# Patient Record
Sex: FEMALE | Race: OTHER | HISPANIC OR LATINO | URBAN - METROPOLITAN AREA
[De-identification: names, ages, dates, MRNs, and addresses within clinical notes are randomized per-mention and may not be internally consistent; named-entity substitution may affect disease eponyms.]

---

## 2022-05-12 ENCOUNTER — EMERGENCY (EMERGENCY)
Facility: HOSPITAL | Age: 42
LOS: 1 days | Discharge: HOME | End: 2022-05-12
Attending: EMERGENCY MEDICINE | Admitting: EMERGENCY MEDICINE
Payer: COMMERCIAL

## 2022-05-12 VITALS
TEMPERATURE: 98 F | HEART RATE: 119 BPM | DIASTOLIC BLOOD PRESSURE: 64 MMHG | WEIGHT: 169.09 LBS | HEIGHT: 69 IN | SYSTOLIC BLOOD PRESSURE: 98 MMHG | OXYGEN SATURATION: 91 % | RESPIRATION RATE: 16 BRPM

## 2022-05-12 VITALS
TEMPERATURE: 98 F | SYSTOLIC BLOOD PRESSURE: 107 MMHG | DIASTOLIC BLOOD PRESSURE: 67 MMHG | HEART RATE: 88 BPM | OXYGEN SATURATION: 100 % | RESPIRATION RATE: 16 BRPM
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
## 2022-05-12 DIAGNOSIS — J45.909 UNSPECIFIED ASTHMA, UNCOMPLICATED: ICD-10-CM

## 2022-05-12 DIAGNOSIS — T78.05XA ANAPHYLACTIC REACTION DUE TO TREE NUTS AND SEEDS, INITIAL ENCOUNTER: ICD-10-CM

## 2022-05-12 DIAGNOSIS — R21 RASH AND OTHER NONSPECIFIC SKIN ERUPTION: ICD-10-CM

## 2022-05-12 PROCEDURE — 99284 EMERGENCY DEPT VISIT MOD MDM: CPT

## 2022-05-12 RX ORDER — DIPHENHYDRAMINE HCL 50 MG
1 CAPSULE ORAL
Qty: 15 | Refills: 0
Start: 2022-05-12 | End: 2022-05-16

## 2022-05-12 RX ORDER — EPINEPHRINE 0.3 MG/.3ML
0.3 INJECTION INTRAMUSCULAR; SUBCUTANEOUS
Qty: 1 | Refills: 0
Start: 2022-05-12

## 2022-05-12 NOTE — ED PROVIDER NOTE - PATIENT PORTAL LINK FT
You can access the FollowMyHealth Patient Portal offered by [facility] by registering at the following website: [email or web address]. By joining [employer]’s FollowMyHealth portal, you will also be able to view your health information using other applications (apps) compatible with our system.

## 2022-05-12 NOTE — ED PROVIDER NOTE - CLINICAL SUMMARY MEDICAL DECISION MAKING FREE TEXT BOX
Received appropriate treatment for anaphylaxis prior to arrival with good effect. No rebound sx during observation. d/c home with supportive care measures/continued steroids and epi pen. given return precautions.

## 2022-05-12 NOTE — ED PROVIDER NOTE - NSFOLLOWUPINSTRUCTIONS_ED_ALL_ED_FT
Anaphylaxis    WHAT YOU NEED TO KNOW:    Anaphylaxis is a life-threatening allergic reaction that must be treated immediately. Your risk for anaphylaxis increases if you have asthma that is severe or not controlled. Medical conditions such as heart disease can also increase your risk. It is important to be prepared if you are at risk for anaphylaxis. Your symptoms can be worse each time you are exposed to the trigger.     DISCHARGE INSTRUCTIONS:    Steps to take for signs or symptoms of anaphylaxis:   •Immediately give 1 shot of epinephrine only into the outer thigh muscle. Even if your allergic reaction seems mild, it can quickly become anaphylaxis. This may happen even if you had a mild reaction to the allergen in the past. Each exposure can cause a different reaction. Watch for signs and symptoms of anaphylaxis every time you are exposed to a trigger. Be ready to give a shot of epinephrine. It is okay to inject epinephrine through clothing. Just be careful to avoid seams, zippers, or other parts that can prevent the needle from entering your skin.  How to Give An Epinephrine Shot Adult           •Leave the shot in place as directed. Your healthcare provider may recommend you leave it in place for up to 10 seconds before you remove it. This helps make sure all of the epinephrine is delivered.       •Call 911 and go to the emergency department, even if the shot improved symptoms. Do not drive yourself. Bring the used epinephrine shot with you.       Call 911 for any of the following:   •You have a skin rash, hives, swelling, or itching.       •You have trouble breathing, shortness of breath, wheezing, or coughing.      •Your throat tightens or your lips or tongue swell.      •You have difficulty swallowing or speaking.      •You are dizzy, lightheaded, confused, or feel like you are going to faint.      •You have nausea, diarrhea, or abdominal cramps, or you are vomiting.      Return to the emergency department if:   •Signs or symptoms of anaphylaxis return.           Contact your healthcare provider if:   •You have questions or concerns about your condition or care.          Medicines:   •Epinephrine is medicine used to treat severe allergic reactions such as anaphylaxis. It is given as a shot into the outer thigh muscle.      •Medicines such as antihistamines, steroids, and bronchodilators decrease inflammation, open airways, and make breathing easier.      •Take your medicine as directed. Contact your healthcare provider if you think your medicine is not helping or if you have side effects. Tell him or her if you are allergic to any medicine. Keep a list of the medicines, vitamins, and herbs you take. Include the amounts, and when and why you take them. Bring the list or the pill bottles to follow-up visits. Carry your medicine list with you in case of an emergency.      Follow up with your healthcare provider as directed: Allergy testing may reveal allergies that can trigger anaphylaxis. Write down your questions so you remember to ask them during your visits.     Safety precautions:   •Keep 2 shots of epinephrine with you at all times. You may need a second shot, because epinephrine only works for about 20 minutes and symptoms may return. Your healthcare provider can show you and family members how to give the shot. Check the expiration date every month and replace it before it expires.      •Create an action plan. Your healthcare provider can help you create a written plan that explains the allergy and an emergency plan to treat a reaction. The plan explains when to give a second epinephrine shot if symptoms return or do not improve after the first. Give copies of the action plan and emergency instructions to family members, and work or school staff. Show them how to give a shot of epinephrine in case you are not able to give it to yourself.      •Be careful when you exercise. If you have had exercise-induced anaphylaxis, do not exercise right after you eat. Stop exercising right away if you start to develop any signs or symptoms of anaphylaxis. You may first feel tired, warm, or have itchy skin. Hives, swelling, and severe breathing problems may develop if you continue to exercise.      •Carry medical alert identification. Wear medical alert jewelry or carry a card that explains the allergy. Ask your healthcare provider where to get these items.   Medical Alert Jewelry           •Identify and avoid known triggers. Read food labels for ingredients. Look for triggers in your environment.      •Ask about treatments to prevent anaphylaxis. You may need allergy shots or other medicines to treat allergies

## 2022-05-12 NOTE — ED PROVIDER NOTE - OBJECTIVE STATEMENT
Pt is a 40 y/o female with medical history of mild asthma and uses Albuterol pump periodically, no recent steroid use, no hospitalizations. Pt BIB EMS from [facility] for anaphylaxis in which Pt drank a hazelnut coffee from [employer] and 15 min later developed full body rash, facial swelling, and difficulty breathing/SOB. Pt denies chest pain, abdominal pain, or nausea and vomiting. Pt was given 2 Epipens, 60 mg Prednisone, 20 mg Pepcid, and 50 mg Benadryl with resolution of symptoms by the time Pt arrived in ER. Pt was mildly hypoxic in triage, but denies SOB. Pt is a 42 y/o female with medical history of mild asthma and uses Albuterol pump periodically, no recent steroid use, no hospitalizations. Pt BIB EMS from [facility] for anaphylaxis in which Pt drank a hazelnut coffee from [employer] and 15 minutes later developed full body rash, facial swelling, and difficulty breathing/SOB. Pt denies chest pain, abdominal pain, or nausea and vomiting. Pt was given 2 Epipens, 60 mg Prednisone, 20 mg Pepcid, and 50 mg Benadryl with resolution of symptoms by the time Pt arrived in ER. Pt was mildly hypoxic in triage, but denies SOB.

## 2022-05-12 NOTE — ED ADULT NURSE NOTE - CHIEF COMPLAINT QUOTE
BIBA from [facility] for allergic reaction. reports drinking a hazelnut coffee and developed SOB and hives. at , given (2) 0.3mg Epi SQ, 50mg Benadryl PO, 60mg Prednisone PO, 20mg Pepcid IM, 1 albuterol treatment. has 22g IV to RAC. given 1 albuterol neb with EMS. pt reports feeling better, coughing in triage. pmhx asthma. no angioedema noted or drooling.

## 2022-05-12 NOTE — ED ADULT NURSE NOTE - NSIMPLEMENTINTERV_GEN_ALL_ED
Implemented All Universal Safety Interventions:  [place] to call system. Call bell, personal items and telephone within reach. Instruct patient to call for assistance. Room bathroom lighting operational. Non-slip footwear when patient is off stretcher. Physically safe environment: no spills, clutter or unnecessary equipment. Stretcher in lowest position, wheels locked, appropriate side rails in place.

## 2022-05-12 NOTE — ED PROVIDER NOTE - PHYSICAL EXAMINATION
VITAL SIGNS: I have reviewed nursing notes and confirm.  CONSTITUTIONAL: Well-developed; well-nourished; in no acute distress.  SKIN: Faint erythematous rash to face, ears, upper extremities. No evidence of angioedema/facial swelling.  HEAD: Normocephalic; atraumatic.  EYES: PERRL, EOM intact; conjunctiva and sclera clear.  ENT: No nasal discharge; airway clear.  NECK: Supple; non tender.  CARD: S1, S2 normal; no murmurs, gallops, or rubs. Regular rate and rhythm.  RESP: Very mild end expiratory wheezing bilaterally, but no increased work of breathing, no tachypnea.   ABD: soft; non-distended; non-tender  EXT: Normal ROM. No cyanosis or edema. Non-ttp all ext, distal pulses intact  LYMPH: No acute cervical adenopathy.  NEURO: Alert, oriented. Grossly unremarkable.  PSYCH: Cooperative, appropriate.

## 2022-05-12 NOTE — ED ADULT TRIAGE NOTE - CHIEF COMPLAINT QUOTE
BIBA from [facility] for allergic reaction. reports drinking a hazelnut coffee and developed SOB and hives. at , given 2 0.3mg Epi SQ, 50mg Benadryl PO, 60mg Prednisone PO, 20mg Pepcid IM, 1 albuterol treatment. has 22g IV to RAC. given 1 albuterol neb with EMS. pt reports feeling better, coughing in triage. BIBA from [facility] for allergic reaction. reports drinking a hazelnut coffee and developed SOB and hives. at , given 2 0.3mg Epi SQ, 50mg Benadryl PO, 60mg Prednisone PO, 20mg Pepcid IM, 1 albuterol treatment. has 22g IV to RAC. given 1 albuterol neb with EMS. pt reports feeling better, coughing in triage. pmhx asthma. no angioedema noted or drooling. BIBA from [facility] for allergic reaction. reports drinking a hazelnut coffee and developed SOB and hives. at , given (2) 0.3mg Epi SQ, 50mg Benadryl PO, 60mg Prednisone PO, 20mg Pepcid IM, 1 albuterol treatment. has 22g IV to RAC. given 1 albuterol neb with EMS. pt reports feeling better, coughing in triage. pmhx asthma. no angioedema noted or drooling.

## 2022-05-12 NOTE — ED ADULT NURSE NOTE - OBJECTIVE STATEMENT
Pt reports drinking hazelnut coffee and having allergic reaction. Pt reports angioedema,  wheezing,  and hives soon after drinking.  EMS gave:  2x  0.3mg Epi SQ, 50mg Benadryl PO, 60mg Prednisone PO, 20mg Pepcid IM, 1 neb treatment.  Pt currently has no complaints.  Pt placed on pulse ox and cardiac monitor.  no wheezing noted.